# Patient Record
Sex: MALE | Race: WHITE | NOT HISPANIC OR LATINO | URBAN - METROPOLITAN AREA
[De-identification: names, ages, dates, MRNs, and addresses within clinical notes are randomized per-mention and may not be internally consistent; named-entity substitution may affect disease eponyms.]

---

## 2023-06-09 ENCOUNTER — OFFICE VISIT (OUTPATIENT)
Dept: URGENT CARE | Facility: CLINIC | Age: 28
End: 2023-06-09
Payer: COMMERCIAL

## 2023-06-09 VITALS
OXYGEN SATURATION: 98 % | HEIGHT: 72 IN | SYSTOLIC BLOOD PRESSURE: 142 MMHG | TEMPERATURE: 99 F | RESPIRATION RATE: 12 BRPM | DIASTOLIC BLOOD PRESSURE: 98 MMHG | HEART RATE: 60 BPM | WEIGHT: 240 LBS | BODY MASS INDEX: 32.51 KG/M2

## 2023-06-09 DIAGNOSIS — S80.12XA CONTUSION OF LEFT LOWER LEG, INITIAL ENCOUNTER: Primary | ICD-10-CM

## 2023-06-09 PROCEDURE — 99203 PR OFFICE/OUTPT VISIT, NEW, LEVL III, 30-44 MIN: ICD-10-PCS | Mod: S$GLB,,,

## 2023-06-09 PROCEDURE — 99203 OFFICE O/P NEW LOW 30 MIN: CPT | Mod: S$GLB,,,

## 2023-06-09 RX ORDER — TRIAMCINOLONE ACETONIDE 0.25 MG/G
CREAM TOPICAL 2 TIMES DAILY
COMMUNITY

## 2023-06-09 RX ORDER — CLOBETASOL PROPIONATE 0.5 MG/G
CREAM TOPICAL 2 TIMES DAILY
COMMUNITY

## 2023-06-09 NOTE — PATIENT INSTRUCTIONS
Rest the extremity--limit activity with this area  Ice 2-3 times a day for 20 minute intervals for first 48 hours or until inflammation has stabilized   You may use compression to provide support and help prevent swelling  Elevation above heart level to help decrease swelling  Pain control with NSAIDs.    You must understand that you have received Urgent Care treatment only and that you may be released before all of your medical problems are known or treated.   Return to Urgent Care or go to ER if symptoms worsen or fail to improve.  Follow up with PCP as recommended for further management.

## 2023-06-09 NOTE — PROGRESS NOTES
Subjective:      Patient ID: Pipe Mercedes is a 28 y.o. male.    Vitals:  height is 6' (1.829 m) and weight is 108.9 kg (240 lb). His temperature is 98.5 °F (36.9 °C). His blood pressure is 142/98 (abnormal) and his pulse is 60. His respiration is 12 and oxygen saturation is 98%.     Chief Complaint: Bleeding/Bruising    Pipe Mercedes is a 28 y.o. male who presents for L leg pain which onset 2 days ago. He noticed a lump on his leg just above his ankle. Yesterday, he woke up with bruising in that area. No known trauma. Pain is 1/10 in severity. No calf pain. Patient denies any fever, chills, SOB, CP, abd pain, n/v/d, dizziness, or numbness/tingling. No other bruising.      Leg Pain   The incident occurred 2 days ago (Noticed 2 days ago). The incident occurred at home. The injury mechanism is unknown. The pain is present in the left leg. Quality: tender to touch. The pain is at a severity of 1/10. The pain is mild. The pain has been Intermittent (only when applying pressure) since onset. Pertinent negatives include no inability to bear weight, loss of motion, loss of sensation, muscle weakness, numbness or tingling. He reports no foreign bodies present. The symptoms are aggravated by palpation. He has tried NSAIDs (Ibuprofen) for the symptoms. The treatment provided mild relief.   Constitution: Negative for chills and fever.   Musculoskeletal:  Positive for pain. Negative for trauma, joint pain, joint swelling, abnormal ROM of joint, muscle cramps and muscle ache.   Skin:  Positive for bruising. Negative for rash and erythema.   Neurological:  Negative for numbness and tingling.    Objective:     Physical Exam   Constitutional: He is oriented to person, place, and time. He appears well-developed.   HENT:   Head: Normocephalic and atraumatic. Head is without abrasion, without contusion and without laceration.   Ears:   Right Ear: External ear normal.   Left Ear: External ear normal.   Nose: Nose normal.    Mouth/Throat: Oropharynx is clear and moist and mucous membranes are normal.   Eyes: Conjunctivae, EOM and lids are normal. Pupils are equal, round, and reactive to light.   Neck: Trachea normal and phonation normal. Neck supple.   Cardiovascular: Normal rate, regular rhythm and normal heart sounds.   Pulmonary/Chest: Effort normal and breath sounds normal. No stridor. No respiratory distress.   Musculoskeletal: Normal range of motion.         General: Normal range of motion.        Legs:       Comments: No bony tenderness.   Neurological: He is alert and oriented to person, place, and time.   Skin: Skin is warm, dry, intact and no rash. Capillary refill takes less than 2 seconds. No abrasion, No burn, No bruising, No erythema and No ecchymosis         Comments: 4 cm area of ecchymosis to the anterior portion of the LLE proximal to the L ankle. Mild tenderness to palpation. No fluctuance. 2+ DP and PT pulse. No calf tenderness. Negative Garland's. 2+ L popliteal. Capillary refill < 2 seconds. Sensation intact. No pallor.   Psychiatric: His speech is normal and behavior is normal. Judgment and thought content normal.   Nursing note and vitals reviewed.    Assessment:     1. Contusion of left lower leg, initial encounter        Plan:       Contusion of left lower leg, initial encounter      Afebrile. VSS.  Contusion of LLE.  No signs of DVT or cellulitis.  Recommend RICE therapy.  NSAIDs/Tylenol as needed for pain.  RTC for any worsening symptoms.